# Patient Record
(demographics unavailable — no encounter records)

---

## 2024-11-11 NOTE — REVIEW OF SYSTEMS
[Patient Intake Form Reviewed] : Patient intake form was reviewed [Muscle Pain] : muscle pain [Back Pain] : back pain [Negative] : Heme/Lymph [Postnasal Drip] : no postnasal drip [Nasal Discharge] : no nasal discharge [Headache] : no headache [Dizziness] : no dizziness [Fainting] : no fainting [Confusion] : no confusion [Unsteady Walk] : no ataxia [Memory Loss] : no memory loss [FreeTextEntry9] : low back pain [FreeTextEntry1] : Patient denies

## 2024-11-11 NOTE — HEALTH RISK ASSESSMENT
[Yes] : Yes [Monthly or less (1 pt)] : Monthly or less (1 point) [1 or 2 (0 pts)] : 1 or 2 (0 points) [Never (0 pts)] : Never (0 points) [No] : In the past 12 months have you used drugs other than those required for medical reasons? No [One fall no injury in past year] : Patient reported one fall in the past year without injury [0] : 1) Little interest or pleasure doing things: Not at all (0) [1] : 2) Feeling down, depressed, or hopeless for several days (1) [PHQ-2 Negative - No further assessment needed] : PHQ-2 Negative - No further assessment needed [Former] : Former [20 or more] : 20 or more [< 15 Years] : < 15 Years [Patient declined mammogram] : Patient declined mammogram [Patient declined PAP Smear] : Patient declined PAP Smear [Patient declined bone density test] : Patient declined bone density test [Patient reported colonoscopy was normal] : Patient reported colonoscopy was normal [HIV Test offered] : HIV Test offered [Hepatitis C test offered] : Hepatitis C test offered [None] : None [With Family] : lives with family [Fully functional (bathing, dressing, toileting, transferring, walking, feeding)] : Fully functional (bathing, dressing, toileting, transferring, walking, feeding) [Fully functional (using the telephone, shopping, preparing meals, housekeeping, doing laundry, using] : Fully functional and needs no help or supervision to perform IADLs (using the telephone, shopping, preparing meals, housekeeping, doing laundry, using transportation, managing medications and managing finances) [Audit-CScore] : 1 [de-identified] : average  [de-identified] : average  [[facility]] : 10 [OFK1Brevg] : 1 [Change in mental status noted] : No change in mental status noted [ColonoscopyDate] : 1/2023

## 2024-11-11 NOTE — PHYSICAL EXAM
[No Acute Distress] : no acute distress [Well Nourished] : well nourished [Well Developed] : well developed [Well-Appearing] : well-appearing [Normal Sclera/Conjunctiva] : normal sclera/conjunctiva [PERRL] : pupils equal round and reactive to light [EOMI] : extraocular movements intact [Normal Outer Ear/Nose] : the outer ears and nose were normal in appearance [Normal Oropharynx] : the oropharynx was normal [No JVD] : no jugular venous distention [No Lymphadenopathy] : no lymphadenopathy [Supple] : supple [Thyroid Normal, No Nodules] : the thyroid was normal and there were no nodules present [No Respiratory Distress] : no respiratory distress  [No Accessory Muscle Use] : no accessory muscle use [Clear to Auscultation] : lungs were clear to auscultation bilaterally [Normal Rate] : normal rate  [Regular Rhythm] : with a regular rhythm [Normal S1, S2] : normal S1 and S2 [No Murmur] : no murmur heard [No Carotid Bruits] : no carotid bruits [No Abdominal Bruit] : a ~M bruit was not heard ~T in the abdomen [No Varicosities] : no varicosities [Pedal Pulses Present] : the pedal pulses are present [No Edema] : there was no peripheral edema [No Palpable Aorta] : no palpable aorta [No Extremity Clubbing/Cyanosis] : no extremity clubbing/cyanosis [Soft] : abdomen soft [Non Tender] : non-tender [Non-distended] : non-distended [No Masses] : no abdominal mass palpated [No HSM] : no HSM [Normal Bowel Sounds] : normal bowel sounds [No CVA Tenderness] : no CVA  tenderness [No Spinal Tenderness] : no spinal tenderness [No Joint Swelling] : no joint swelling [Grossly Normal Strength/Tone] : grossly normal strength/tone [No Rash] : no rash [Coordination Grossly Intact] : coordination grossly intact [No Focal Deficits] : no focal deficits [Normal Gait] : normal gait [Deep Tendon Reflexes (DTR)] : deep tendon reflexes were 2+ and symmetric [Normal Affect] : the affect was normal [Normal Insight/Judgement] : insight and judgment were intact [de-identified] : low back pain with ROM

## 2024-11-11 NOTE — CURRENT MEDS
[Takes medication as prescribed] : takes [None] : Patient does not have any barriers to medication adherence [Yes] : Reviewed medication list for presence of high-risk medications. [FreeTextEntry1] : Patient does not need any refills at this time. Admits to regular medication adherence.

## 2024-11-11 NOTE — PLAN
[FreeTextEntry1] : Pt is a 76 M with a PMHx of DM2, CABG, HLD, and mild COPD here for 3 month follow up. Patient has been monitoring his diet.  Denies any changes in mental status, dizziness, fainting, confusion, memory loss or headaches. still with balance issues almost falling while walking did not see Neuro yet.    -labs obtained  -meds reviewed and renewed -F/u in 3 months, sooner with new concerns or worsening symptoms

## 2024-11-11 NOTE — COUNSELING
[Fall prevention counseling provided] : Fall prevention counseling provided [Adequate lighting] : Adequate lighting [No throw rugs] : No throw rugs [Use proper foot wear] : Use proper foot wear [Behavioral health counseling provided] : Behavioral health counseling provided [Sleep ___ hours/day] : Sleep [unfilled] hours/day [Engage in a relaxing activity] : Engage in a relaxing activity [Plan in advance] : Plan in advance [AUDIT-C Screening administered and reviewed] : AUDIT-C Screening administered and reviewed [Potential consequences of obesity discussed] : Potential consequences of obesity discussed [Benefits of weight loss discussed] : Benefits of weight loss discussed [Encouraged to increase physical activity] : Encouraged to increase physical activity [Encouraged to use exercise tracking device] : Encouraged to use exercise tracking device [Weigh Self Weekly] : weigh self weekly [Decrease Portions] : decrease portions [None] : None [Good understanding] : Patient has a good understanding of lifestyle changes and steps needed to achieve self management goal [FreeTextEntry2] : Former Smoker

## 2024-11-11 NOTE — HISTORY OF PRESENT ILLNESS
[FreeTextEntry1] : Pt is a 76 M with a PMHx of DM2, CABG, HLD, and mild COPD here for 3 month follow up. Patient has been monitoring his diet.  Denies any changes in mental status, dizziness, fainting, confusion, memory loss or headaches. still with balance issues almost falling while walking did not see Neuro yet.

## 2025-02-05 NOTE — DATA REVIEWED
[de-identified] : Lumbosacral spine MRI was done September 9, 2024.  There was an endplate compression fracture of L1.  It was subacute to chronic.  There was discogenic degenerative disease and facet arthropathy of the lumbar spine most notable at L3-4 and L4-5 where he had moderate bilateral neural foraminal narrowing and mild central canal narrowing.

## 2025-02-05 NOTE — ASSESSMENT
[FreeTextEntry1] : This is a 77-year-old man with the leg weakness falls difficulty climbing up ladders with larger steps but can handle normal staircase.  Would like to check for other causes of neuropathy besides diabetes as well as evaluate for myopathy.  I will send him for physical therapy for strengthening exercises and will ask for home exercise program.  I will see him back in follow-up for continued care in 4 months, sooner should the need arise.  I will call him in the interim with his blood work results and enact any treatment plan as needed.

## 2025-02-05 NOTE — CONSULT LETTER
[Dear  ___] : Dear  [unfilled], [Consult Letter:] : I had the pleasure of evaluating your patient, [unfilled]. [Please see my note below.] : Please see my note below. [Consult Closing:] : Thank you very much for allowing me to participate in the care of this patient.  If you have any questions, please do not hesitate to contact me. [Sincerely,] : Sincerely, [FreeTextEntry3] : Elie Irizarry M.D., Ph.D. DPN-N [facility] [place] Director, Division of Neurology Director, Comprehensive Stroke Center [facility]

## 2025-02-05 NOTE — HISTORY OF PRESENT ILLNESS
[FreeTextEntry1] : Initial office visit February 5, 2025: This is a 77-year-old man who presents today for frequent falls as well as leg weakness.  This has been going on for a bit over a year.  He has had several falls.  He feels sometimes his legs give out from underneath him.  He has trouble catching his balance at times.  When walking at baseline he feels a bit off balance.  He does have some numbness in his feet and does have type 2 diabetes.  He has been told that he has decreased sensation in his feet, he likely has a diabetic neuropathy.  He is also having trouble climbing up ladders with longer steps but he can navigate stairs okay.  He can get off a low seat such as a toilet without using his hands.  He does not have any significant pain into his legs.  He is on high dose statin but he has not changed his dose for over 2 years.  He had a lumbar spine MRI done in the fall which showed degenerative changes and mild central canal stenosis.  He is here today for neurologic evaluation.

## 2025-02-05 NOTE — PHYSICAL EXAM
[General Appearance - Alert] : alert [General Appearance - In No Acute Distress] : in no acute distress [General Appearance - Well Nourished] : well nourished [General Appearance - Well Developed] : well developed [Person] : oriented to person [Place] : oriented to place [Time] : oriented to time [Remote Intact] : remote memory intact [Registration Intact] : recent registration memory intact [Span Intact] : the attention span was normal [Concentration Intact] : normal concentrating ability [Visual Intact] : visual attention was ~T not ~L decreased [Naming Objects] : no difficulty naming common objects [Repeating Phrases] : no difficulty repeating a phrase [Fluency] : fluency intact [Comprehension] : comprehension intact [Current Events] : adequate knowledge of current events [Past History] : adequate knowledge of personal past history [Cranial Nerves Optic (II)] : visual acuity intact bilaterally,  visual fields full to confrontation, pupils equal round and reactive to light [Cranial Nerves Oculomotor (III)] : extraocular motion intact [Cranial Nerves Trigeminal (V)] : facial sensation intact symmetrically [Cranial Nerves Facial (VII)] : face symmetrical [Cranial Nerves Vestibulocochlear (VIII)] : hearing was intact bilaterally [Cranial Nerves Glossopharyngeal (IX)] : tongue and palate midline [Cranial Nerves Accessory (XI - Cranial And Spinal)] : head turning and shoulder shrug symmetric [Cranial Nerves Hypoglossal (XII)] : there was no tongue deviation with protrusion [Motor Tone] : muscle tone was normal in all four extremities [Motor Strength] : muscle strength was normal in all four extremities [Involuntary Movements] : no involuntary movements were seen [No Muscle Atrophy] : normal bulk in all four extremities [Paresis Pronator Drift Right-Sided] : no pronator drift on the right [Paresis Pronator Drift Left-Sided] : no pronator drift on the left [Motor Strength Upper Extremities Bilaterally] : strength was normal in both upper extremities [Motor Strength Lower Extremities Bilaterally] : strength was normal in both lower extremities [Tactile Decrease Entire Leg Right] : diminished right leg [Tactile Decrease Entire Leg Left] : diminished left leg [Pain / Temp Decrease Entire Leg - Right] : diminished right leg [Vibration Decrease Leg / Foot Both Ankles] : decreased at both ankles [Pain / Temp Decrease Entire Leg - Left] : diminished left leg [Vibration Decrease Leg / Foot Toes Both Feet] : decreased at the toes of both feet  [Abnormal Walk] : normal gait [Balance] : balance was intact [Past-pointing] : there was no past-pointing [Tremor] : no tremor present [2+] : Patella left 2+ [Sclera] : the sclera and conjunctiva were normal [PERRL With Normal Accommodation] : pupils were equal in size, round, reactive to light, with normal accommodation [Extraocular Movements] : extraocular movements were intact [No APD] : no afferent pupillary defect [No FERNANDA] : no internuclear ophthalmoplegia [Full Visual Field] : full visual field

## 2025-03-20 NOTE — REVIEW OF SYSTEMS
[Patient Intake Form Reviewed] : Patient intake form was reviewed [Negative] : Heme/Lymph [Joint Pain] : no joint pain [Joint Stiffness] : no joint stiffness [Muscle Pain] : no muscle pain [Muscle Weakness] : no muscle weakness [Joint Swelling] : no joint swelling [FreeTextEntry9] : Left-sided rib pain worse with inspiration

## 2025-03-20 NOTE — HISTORY OF PRESENT ILLNESS
[FreeTextEntry8] : Pt is a 76 y/o M with a PMHx of DM2, CABG, HLD, and mild COPD who presents for rib pain. Pt states that he woke up one morning a couple weeks ago with lower left-sided rib pain. Pt denies any acute injury, but notes that the day before he was reaching down in his car to change a filter. Pt reports that he does not have pain at rest, but that when he palpates the rib he rates it as a 2-3/10. He notes that the pain worsens when he takes a deep breath. Pt denies taking any medications for the pain. Pt reports that [facility], and that his HbA1c = 6.4%. Pt denies any other acute complaints or concerns. Pt denies fever, chills, headache, chest pain, SOB, abdominal pain, N/V/D.

## 2025-03-20 NOTE — PHYSICAL EXAM
[No Acute Distress] : no acute distress [Well Nourished] : well nourished [Well Developed] : well developed [Well-Appearing] : well-appearing [Normal Sclera/Conjunctiva] : normal sclera/conjunctiva [PERRL] : pupils equal round and reactive to light [EOMI] : extraocular movements intact [Normal Outer Ear/Nose] : the outer ears and nose were normal in appearance [Normal Oropharynx] : the oropharynx was normal [No JVD] : no jugular venous distention [No Lymphadenopathy] : no lymphadenopathy [Supple] : supple [Thyroid Normal, No Nodules] : the thyroid was normal and there were no nodules present [No Respiratory Distress] : no respiratory distress  [No Accessory Muscle Use] : no accessory muscle use [Clear to Auscultation] : lungs were clear to auscultation bilaterally [Normal Rate] : normal rate  [Regular Rhythm] : with a regular rhythm [Normal S1, S2] : normal S1 and S2 [No Murmur] : no murmur heard [No Carotid Bruits] : no carotid bruits [No Abdominal Bruit] : a ~M bruit was not heard ~T in the abdomen [No Varicosities] : no varicosities [Pedal Pulses Present] : the pedal pulses are present [No Edema] : there was no peripheral edema [No Palpable Aorta] : no palpable aorta [No Extremity Clubbing/Cyanosis] : no extremity clubbing/cyanosis [Soft] : abdomen soft [Non Tender] : non-tender [Non-distended] : non-distended [No Masses] : no abdominal mass palpated [No HSM] : no HSM [Normal Bowel Sounds] : normal bowel sounds [No CVA Tenderness] : no CVA  tenderness [No Spinal Tenderness] : no spinal tenderness [No Joint Swelling] : no joint swelling [Grossly Normal Strength/Tone] : grossly normal strength/tone [No Rash] : no rash [Coordination Grossly Intact] : coordination grossly intact [No Focal Deficits] : no focal deficits [Normal Gait] : normal gait [Deep Tendon Reflexes (DTR)] : deep tendon reflexes were 2+ and symmetric [Normal Affect] : the affect was normal [Normal Insight/Judgement] : insight and judgment were intact [de-identified] : TTP of left-sided rib (5th or 6th rib) over anterior axillary line

## 2025-03-20 NOTE — HEALTH RISK ASSESSMENT
[Yes] : Yes [Monthly or less (1 pt)] : Monthly or less (1 point) [1 or 2 (0 pts)] : 1 or 2 (0 points) [Never (0 pts)] : Never (0 points) [No] : In the past 12 months have you used drugs other than those required for medical reasons? No [One fall no injury in past year] : Patient reported one fall in the past year without injury [PHQ-2 Negative - No further assessment needed] : PHQ-2 Negative - No further assessment needed [Former] : Former [20 or more] : 20 or more [< 15 Years] : < 15 Years [Patient declined mammogram] : Patient declined mammogram [Patient declined PAP Smear] : Patient declined PAP Smear [Patient declined bone density test] : Patient declined bone density test [Patient reported colonoscopy was normal] : Patient reported colonoscopy was normal [HIV Test offered] : HIV Test offered [Hepatitis C test offered] : Hepatitis C test offered [None] : None [With Family] : lives with family [Fully functional (bathing, dressing, toileting, transferring, walking, feeding)] : Fully functional (bathing, dressing, toileting, transferring, walking, feeding) [Fully functional (using the telephone, shopping, preparing meals, housekeeping, doing laundry, using] : Fully functional and needs no help or supervision to perform IADLs (using the telephone, shopping, preparing meals, housekeeping, doing laundry, using transportation, managing medications and managing finances) [0] : 2) Feeling down, depressed, or hopeless: Not at all (0) [Audit-CScore] : 1 [de-identified] : average  [de-identified] : average  [[facility]] : 10 [[id number]] : 0 [Change in mental status noted] : No change in mental status noted [ColonoscopyDate] : 1/2023

## 2025-03-20 NOTE — PLAN
[FreeTextEntry1] : Pt is a 76 y/o M with a PMHx of DM2, CABG, HLD, and mild COPD who presents for rib pain. Pt states that he woke up one morning a couple weeks ago with lower left-sided rib pain. Pt denies any acute injury, but notes that the day before he was reaching down in his car to change a filter. Pt reports that he does not have pain at rest, but that when he palpates the rib he rates it as a 2-3/10. He notes that the pain worsens when he takes a deep breath. Pt denies taking any medications for the pain. Pt reports [facility] and that his HbA1c = 6.4%. Pt denies any other acute complaints or concerns. Pt denies fever, chills, headache, chest pain, SOB, abdominal pain, N/V/D.  - plan of care reviewed - medications reviewed - Script for XR left ribs provided to r/o a rib fracture - prescribed Meloxicam 7.5 mg QD x 14 days for rib pain - RTC for regular f/u appt

## 2025-05-06 NOTE — PLAN
[FreeTextEntry1] : Pt is a 78 y/o M with a PMHx of DM2, CABG, HLD, and mild COPD who presents for follow up and medication refill. Patient states that he is feeling well and denies any acute complaints today. He states that his rib pain from last visit has resolved. Denies recent hospitalizations or illnesses. Denies fever, chills, SOB, chest pain, or abdominal pain.  - plan of care reviewed - medications reviewed and renewed - labs drawn today; A1C 6.0% today - RTC in 3 months

## 2025-05-06 NOTE — ASSESSMENT
[FreeTextEntry1] : Pt is a 76 y/o M with a PMHx of DM2, CABG, HLD, and mild COPD who presents for follow up and medication refill. Patient states that he is feeling well and denies any acute complaints today. He states that his rib pain from last visit has resolved. Denies recent hospitalizations or illnesses. Denies fever, chills, SOB, chest pain, or abdominal pain.

## 2025-05-06 NOTE — HISTORY OF PRESENT ILLNESS
[FreeTextEntry1] : Pt is a 76 y/o M with a PMHx of DM2, CABG, HLD, and mild COPD who presents for follow up. [de-identified] : Pt is a 78 y/o M with a PMHx of DM2, CABG, HLD, and mild COPD who presents for follow up and medication refill. Patient states that he is feeling well and denies any acute complaints today. He states that his rib pain from last visit has resolved. Denies recent hospitalizations or illnesses. Denies fever, chills, SOB, chest pain, or abdominal pain.

## 2025-05-06 NOTE — HEALTH RISK ASSESSMENT
[Yes] : Yes [Monthly or less (1 pt)] : Monthly or less (1 point) [1 or 2 (0 pts)] : 1 or 2 (0 points) [Never (0 pts)] : Never (0 points) [No] : In the past 12 months have you used drugs other than those required for medical reasons? No [One fall no injury in past year] : Patient reported one fall in the past year without injury [0] : 2) Feeling down, depressed, or hopeless: Not at all (0) [PHQ-2 Negative - No further assessment needed] : PHQ-2 Negative - No further assessment needed [Former] : Former [20 or more] : 20 or more [< 15 Years] : < 15 Years [Patient declined mammogram] : Patient declined mammogram [Patient declined PAP Smear] : Patient declined PAP Smear [Patient declined bone density test] : Patient declined bone density test [Patient reported colonoscopy was normal] : Patient reported colonoscopy was normal [HIV Test offered] : HIV Test offered [Hepatitis C test offered] : Hepatitis C test offered [None] : None [With Family] : lives with family [Fully functional (bathing, dressing, toileting, transferring, walking, feeding)] : Fully functional (bathing, dressing, toileting, transferring, walking, feeding) [Fully functional (using the telephone, shopping, preparing meals, housekeeping, doing laundry, using] : Fully functional and needs no help or supervision to perform IADLs (using the telephone, shopping, preparing meals, housekeeping, doing laundry, using transportation, managing medications and managing finances) [Audit-CScore] : 1 [de-identified] : average  [de-identified] : average  [[facility]] : 10 [[id number]] : 0 [Change in mental status noted] : No change in mental status noted [ColonoscopyDate] : 1/2023

## 2025-05-06 NOTE — PHYSICAL EXAM
[No Acute Distress] : no acute distress [Well Nourished] : well nourished [Well Developed] : well developed [Well-Appearing] : well-appearing [Normal Sclera/Conjunctiva] : normal sclera/conjunctiva [PERRL] : pupils equal round and reactive to light [EOMI] : extraocular movements intact [Normal Outer Ear/Nose] : the outer ears and nose were normal in appearance [Normal Oropharynx] : the oropharynx was normal [No JVD] : no jugular venous distention [No Lymphadenopathy] : no lymphadenopathy [Supple] : supple [Thyroid Normal, No Nodules] : the thyroid was normal and there were no nodules present [No Respiratory Distress] : no respiratory distress  [No Accessory Muscle Use] : no accessory muscle use [Clear to Auscultation] : lungs were clear to auscultation bilaterally [Normal Rate] : normal rate  [Regular Rhythm] : with a regular rhythm [Normal S1, S2] : normal S1 and S2 [No Murmur] : no murmur heard [No Carotid Bruits] : no carotid bruits [No Abdominal Bruit] : a ~M bruit was not heard ~T in the abdomen [No Varicosities] : no varicosities [Pedal Pulses Present] : the pedal pulses are present [No Edema] : there was no peripheral edema [No Palpable Aorta] : no palpable aorta [No Extremity Clubbing/Cyanosis] : no extremity clubbing/cyanosis [Soft] : abdomen soft [Non Tender] : non-tender [Non-distended] : non-distended [No Masses] : no abdominal mass palpated [No HSM] : no HSM [Normal Bowel Sounds] : normal bowel sounds [No CVA Tenderness] : no CVA  tenderness [No Spinal Tenderness] : no spinal tenderness [No Joint Swelling] : no joint swelling [Grossly Normal Strength/Tone] : grossly normal strength/tone [No Rash] : no rash [Coordination Grossly Intact] : coordination grossly intact [No Focal Deficits] : no focal deficits [Normal Gait] : normal gait [Deep Tendon Reflexes (DTR)] : deep tendon reflexes were 2+ and symmetric [Normal Affect] : the affect was normal [Normal Insight/Judgement] : insight and judgment were intact [de-identified] : TTP of left-sided rib (5th or 6th rib) over anterior axillary line

## 2025-06-10 NOTE — HISTORY OF PRESENT ILLNESS
[FreeTextEntry1] : Initial office visit February 5, 2025: This is a 77-year-old man who presents today for frequent falls as well as leg weakness.  This has been going on for a bit over a year.  He has had several falls.  He feels sometimes his legs give out from underneath him.  He has trouble catching his balance at times.  When walking at baseline he feels a bit off balance.  He does have some numbness in his feet and does have type 2 diabetes.  He has been told that he has decreased sensation in his feet, he likely has a diabetic neuropathy.  He is also having trouble climbing up ladders with longer steps but he can navigate stairs okay.  He can get off a low seat such as a toilet without using his hands.  He does not have any significant pain into his legs.  He is on high dose statin but he has not changed his dose for over 2 years.  He had a lumbar spine MRI done in the fall which showed degenerative changes and mild central canal stenosis.  He is here today for neurologic evaluation.  Follow-up Alayna 10, 2025: This is a 77-year-old man who presents today with frequent falls gait issues and leg weakness.  He also complains today of memory loss.  Regarding his initial complaint of falls and leg weakness he is doing better after physical therapy.  He has more endurance and feels more steady on his feet.  He continues to do balance exercises that were taught in physical therapy at home.  He is exercising by walking as well as running around after his grandchildren.  Additionally we had checked myopathy labs which were negative.  He feels that he is doing better on this front.  However today he is a bit concerned also because of memory issues.  He states that sometimes I go on a room and forget why, he may forget what he is trying to say or trip up on a word in the middle of a conversation, he is also having trouble with [employer] sometimes not realizing that he read he has a number and one of the squares in place that number twice leading to an error.  Previously had been very good at [employer].  He is concerned because there is a family history of dementia.  He is here today for follow-up.

## 2025-06-10 NOTE — PHYSICAL EXAM
[Person] : oriented to person [Place] : oriented to place [Time] : oriented to time [Short Term Intact] : short term memory impaired [Registration Intact] : recent registration memory intact [Remote Intact] : remote memory intact [Span Intact] : the attention span was normal [Visual Intact] : visual attention was ~T not ~L decreased [Concentration Intact] : normal concentrating ability [Repeating Phrases] : no difficulty repeating a phrase [Naming Objects] : no difficulty naming common objects [Fluency] : fluency intact [Comprehension] : comprehension intact [Current Events] : adequate knowledge of current events [Past History] : adequate knowledge of personal past history [Cranial Nerves Optic (II)] : visual acuity intact bilaterally,  visual fields full to confrontation, pupils equal round and reactive to light [Cranial Nerves Trigeminal (V)] : facial sensation intact symmetrically [Cranial Nerves Facial (VII)] : face symmetrical [Cranial Nerves Oculomotor (III)] : extraocular motion intact [Cranial Nerves Vestibulocochlear (VIII)] : hearing was intact bilaterally [Cranial Nerves Glossopharyngeal (IX)] : tongue and palate midline [Cranial Nerves Accessory (XI - Cranial And Spinal)] : head turning and shoulder shrug symmetric [Motor Tone] : muscle tone was normal in all four extremities [Cranial Nerves Hypoglossal (XII)] : there was no tongue deviation with protrusion [Motor Strength] : muscle strength was normal in all four extremities [No Muscle Atrophy] : normal bulk in all four extremities [Involuntary Movements] : no involuntary movements were seen [Paresis Pronator Drift Right-Sided] : no pronator drift on the right [Motor Strength Upper Extremities Bilaterally] : strength was normal in both upper extremities [Paresis Pronator Drift Left-Sided] : no pronator drift on the left [Motor Strength Lower Extremities Bilaterally] : strength was normal in both lower extremities [Tactile Decrease Entire Leg Right] : diminished right leg [Pain / Temp Decrease Entire Leg - Right] : diminished right leg [Tactile Decrease Entire Leg Left] : diminished left leg [Vibration Decrease Leg / Foot Both Ankles] : decreased at both ankles [Pain / Temp Decrease Entire Leg - Left] : diminished left leg [Vibration Decrease Leg / Foot Toes Both Feet] : decreased at the toes of both feet  [Abnormal Walk] : normal gait [Balance] : balance was intact [Tremor] : no tremor present [Coordination - Dysmetria Impaired Finger-to-Nose Bilateral] : not present [2+] : Patella left 2+ [FreeTextEntry4] : 1 out of 3 recall, 3 out of 3 with single prompt [PERRL With Normal Accommodation] : pupils were equal in size, round, reactive to light, with normal accommodation [Sclera] : the sclera and conjunctiva were normal [Extraocular Movements] : extraocular movements were intact [No APD] : no afferent pupillary defect [No FERNANDA] : no internuclear ophthalmoplegia [Full Visual Field] : full visual field

## 2025-06-10 NOTE — REVIEW OF SYSTEMS
[As Noted in HPI] : as noted in HPI [Difficulty with Language] : ~M difficulty with language [Memory Lapses or Loss] : memory loss [Negative] : Heme/Lymph

## 2025-06-10 NOTE — CONSULT LETTER
[Dear  ___] : Dear  [unfilled], [Courtesy Letter:] : I had the pleasure of seeing your patient, [unfilled], in my office today. [Please see my note below.] : Please see my note below. [Consult Closing:] : Thank you very much for allowing me to participate in the care of this patient.  If you have any questions, please do not hesitate to contact me. [Sincerely,] : Sincerely, [FreeTextEntry3] : Elie Irizarry M.D., Ph.D. DPN-N [facility] [place] Director, Division of Neurology Director, [facility]